# Patient Record
Sex: FEMALE | Race: OTHER | NOT HISPANIC OR LATINO | URBAN - METROPOLITAN AREA
[De-identification: names, ages, dates, MRNs, and addresses within clinical notes are randomized per-mention and may not be internally consistent; named-entity substitution may affect disease eponyms.]

---

## 2017-03-26 ENCOUNTER — EMERGENCY (EMERGENCY)
Facility: HOSPITAL | Age: 47
LOS: 1 days | Discharge: HOME | End: 2017-03-26
Attending: EMERGENCY MEDICINE | Admitting: EMERGENCY MEDICINE
Payer: MEDICAID

## 2017-03-26 VITALS
OXYGEN SATURATION: 99 % | TEMPERATURE: 98 F | RESPIRATION RATE: 16 BRPM | HEART RATE: 65 BPM | DIASTOLIC BLOOD PRESSURE: 85 MMHG | SYSTOLIC BLOOD PRESSURE: 134 MMHG

## 2017-03-26 DIAGNOSIS — Z88.2 ALLERGY STATUS TO SULFONAMIDES: ICD-10-CM

## 2017-03-26 DIAGNOSIS — R51 HEADACHE: ICD-10-CM

## 2017-03-26 DIAGNOSIS — G43.909 MIGRAINE, UNSPECIFIED, NOT INTRACTABLE, WITHOUT STATUS MIGRAINOSUS: ICD-10-CM

## 2017-03-26 PROCEDURE — 99284 EMERGENCY DEPT VISIT MOD MDM: CPT

## 2017-03-26 NOTE — ED ADULT NURSE NOTE - OBJECTIVE STATEMENT
47 y/o female presented to the ED c/o progressively worsening migraine x 3 days. Pt reports intermittent blurred vision, nausea but denies vomiting. Pt reports neck pain and photophobia. Pt denies fevers. Pt is A&Ox3, PERRL, lungs CTA B/L, abd. soft, NT, ND, skin W/D/I, MAex4.

## 2017-03-27 VITALS
SYSTOLIC BLOOD PRESSURE: 103 MMHG | HEART RATE: 68 BPM | OXYGEN SATURATION: 99 % | DIASTOLIC BLOOD PRESSURE: 63 MMHG | RESPIRATION RATE: 16 BRPM

## 2017-03-27 PROCEDURE — 96374 THER/PROPH/DIAG INJ IV PUSH: CPT

## 2017-03-27 PROCEDURE — 99284 EMERGENCY DEPT VISIT MOD MDM: CPT | Mod: 25

## 2017-03-27 PROCEDURE — 96375 TX/PRO/DX INJ NEW DRUG ADDON: CPT

## 2017-03-27 RX ORDER — SODIUM CHLORIDE 9 MG/ML
3 INJECTION INTRAMUSCULAR; INTRAVENOUS; SUBCUTANEOUS EVERY 8 HOURS
Qty: 0 | Refills: 0 | Status: DISCONTINUED | OUTPATIENT
Start: 2017-03-27 | End: 2017-03-30

## 2017-03-27 RX ORDER — ONDANSETRON 8 MG/1
1 TABLET, FILM COATED ORAL
Qty: 6 | Refills: 0 | OUTPATIENT
Start: 2017-03-27 | End: 2017-03-29

## 2017-03-27 RX ORDER — KETOROLAC TROMETHAMINE 30 MG/ML
30 SYRINGE (ML) INJECTION ONCE
Qty: 0 | Refills: 0 | Status: DISCONTINUED | OUTPATIENT
Start: 2017-03-27 | End: 2017-03-27

## 2017-03-27 RX ORDER — ACETAMINOPHEN 500 MG
1000 TABLET ORAL ONCE
Qty: 0 | Refills: 0 | Status: COMPLETED | OUTPATIENT
Start: 2017-03-27 | End: 2017-03-27

## 2017-03-27 RX ORDER — DEXAMETHASONE 0.5 MG/5ML
4 ELIXIR ORAL ONCE
Qty: 0 | Refills: 0 | Status: COMPLETED | OUTPATIENT
Start: 2017-03-27 | End: 2017-03-27

## 2017-03-27 RX ORDER — METOCLOPRAMIDE HCL 10 MG
10 TABLET ORAL ONCE
Qty: 0 | Refills: 0 | Status: COMPLETED | OUTPATIENT
Start: 2017-03-27 | End: 2017-03-27

## 2017-03-27 RX ORDER — SODIUM CHLORIDE 9 MG/ML
1000 INJECTION INTRAMUSCULAR; INTRAVENOUS; SUBCUTANEOUS ONCE
Qty: 0 | Refills: 0 | Status: COMPLETED | OUTPATIENT
Start: 2017-03-27 | End: 2017-03-27

## 2017-03-27 RX ADMIN — SODIUM CHLORIDE 2000 MILLILITER(S): 9 INJECTION INTRAMUSCULAR; INTRAVENOUS; SUBCUTANEOUS at 03:05

## 2017-03-27 RX ADMIN — Medication 30 MILLIGRAM(S): at 00:30

## 2017-03-27 RX ADMIN — Medication 400 MILLIGRAM(S): at 00:29

## 2017-03-27 RX ADMIN — Medication 10 MILLIGRAM(S): at 00:29

## 2017-03-27 RX ADMIN — Medication 1000 MILLIGRAM(S): at 01:16

## 2017-03-27 RX ADMIN — Medication 30 MILLIGRAM(S): at 01:16

## 2017-03-27 RX ADMIN — SODIUM CHLORIDE 3 MILLILITER(S): 9 INJECTION INTRAMUSCULAR; INTRAVENOUS; SUBCUTANEOUS at 01:16

## 2017-03-27 RX ADMIN — Medication 4 MILLIGRAM(S): at 00:29

## 2017-03-27 NOTE — ED PROVIDER NOTE - MEDICAL DECISION MAKING DETAILS
**ATTENDING MEDICAL DECISION MAKING/SYNTHESIS (Dr. Yury Nichole): I have reviewed the Chief Complaint, the HPI, the ROS, and have directly performed and confirmed the findings on the Physical Examination. I have reviewed the medical decision making with all providers, as applicable. The PROBLEM REPRESENTATION at this time is: 46-year-old woman with prior history of headaches (usually takes ibuprofen and baclofen) now with recurrent right-sided frontal and parietal-temporal headache with mild nausea and photophobia but without fevers or chills. NO history of trauma. NO reported sick contacts. DENIES recent travel. POSITIVE allergy to sulfonamides.  The MOST LIKELY DIAGNOSIS, and the LIST OF DIFFERENTIAL DIAGNOSES, includes (but is not limited to) the following: headache, intracerebral hemorrhage, serious bacterial infection or sepsis/severe sepsis e.g. meningococcemia, meningitis (viral or bacterial), or encephalitis, dehydration, electrolyte-metabolic-endocrine derangements, mass/tumor. The likelihood of each of these diagnoses has been appropriately considered in the context of this patient's presentation and my evaluation. PLAN: as described in EMR, including diagnostics, therapeutics and consultation as clinically warranted. I will continue to reevaluate the patient, including the results of all testing, and monitor response to therapy throughout the patient's course in the ED.

## 2017-03-27 NOTE — ED PROVIDER NOTE - MUSCULOSKELETAL, MLM
Spine appears normal, range of motion is not limited, no muscle or joint tenderness **ATTENDING ADDENDUM (Dr. Yury Nichole): strength 5/5 symmetrically equal in the bilateral upper and lower extremities.

## 2017-03-27 NOTE — ED PROVIDER NOTE - OBJECTIVE STATEMENT
**ATTENDING OBJECTIVE STATEMENT (Dr. Yury Nichole): I have reviewed this note, the presenting symptoms, and the Chief Complaint and the History of Present Illness as documented, with the other care provider(s) and nurses on the patient care team. I have also reviewed this patient's past medical/surgical history and social/family history as reviewed and listed in this electronic medical record. Patient is a 46-year-old woman with the chief complaint of migraine headache. Patient reports that she has experienced headaches like this one previously. This headache is reported to be consistent with the severity of other headaches. Right-sided and frontal-parietal-temporal in location. NO movement-associated, pleuritic, reproducible, positional, or exertional component to the symptoms. NO fevers or chills. NO trauma. DENIES recent travel. NO obvious sick contacts. NO vomiting but POSITIVE nausea. NO diarrhea or constipation. NO chest pain, palpitations, shortness of breath, dyspnea on exertion, abdominal pain, or frequency, urgency, hesitancy, dysuria, or flank/back pain. NO syncope or near-syncope. NO numbness, tingling, weakness, or paresthesias. NO focal or generalized weakness. NO speech or visual changes. POSITIVE mild photophobia. Here for evaluation. VAS 3-4/10. Evaluated initially in darkened room.   NOTE: family provided translation services and patient/family did not request, need, or want additional translation services at time of initial evaluation

## 2017-03-27 NOTE — ED PROVIDER NOTE - CARE PLAN
Principal Discharge DX:	Migraine  Goal:	ATTENDING ADDENDUM (Dr. Yury Nichole): Goals of care include resolution of emergent/urgent symptoms and concerns, and restoration to baseline level of homeostasis.  Instructions for follow-up, activity and diet:	ATTENDING ADDENDUM (Dr. Yury Nichole): (1) anticipatory guidance provided  (2) rest  (3) outpatient follow-up with your primary care physician/provider (4) return if symptoms worsen, persist, or do not resolve (5) medications, if indicated, as prescribed (ondanstetron as directed).

## 2017-03-27 NOTE — ED PROVIDER NOTE - EYES, MLM
Clear bilaterally, pupils equal, round and reactive to light. **ATTENDING ADDENDUM (Dr. Yury Nichole): Extraocular muscle movements intact. NO nystagmus. POSITIVE minimal photophobia.

## 2017-03-27 NOTE — ED PROVIDER NOTE - CHPI ED SYMPTOMS NEG
no blurred vision/no vomiting/no numbness/no weakness/no fever/no confusion/no loss of consciousness/no change in level of consciousness/no dizziness

## 2017-03-27 NOTE — ED PROVIDER NOTE - PLAN OF CARE
ATTENDING ADDENDUM (Dr. Yury Nichole): Goals of care include resolution of emergent/urgent symptoms and concerns, and restoration to baseline level of homeostasis. ATTENDING ADDENDUM (Dr. Yury Nichole): (1) anticipatory guidance provided  (2) rest  (3) outpatient follow-up with your primary care physician/provider (4) return if symptoms worsen, persist, or do not resolve (5) medications, if indicated, as prescribed (ondanstetron as directed).

## 2017-03-27 NOTE — ED PROVIDER NOTE - ATTENDING CONTRIBUTION TO CARE
**ATTENDING ADDENDUM (Dr. Yury Nichole): I attest that I have directly examined this patient and reviewed and formulated the diagnostic and therapeutic management plan in collaboration with the EM resident. Please see MDM note and remainder of EMR for findings from CC, HPI, ROS, and PE. (Luisito)

## 2017-03-27 NOTE — ED PROVIDER NOTE - ENMT, MLM
Airway patent, Nasal mucosa clear. Mouth with normal mucosa. Throat has no vesicles, no oropharyngeal exudates and uvula is midline. **ATTENDING ADDENDUM (Dr. Yury Nichole): NO facial droop. NO slurred speech. NO drooling.

## 2017-03-27 NOTE — ED PROVIDER NOTE - PHYSICAL EXAMINATION
**ATTENDING ADDENDUM (Dr. Yury Nichole): I have reviewed and substantially contributed to the elements of the PE as documented above. I have directly performed an examination of this patient in conjunction with the other members (EM resident/PA/NP) of the patient care team.

## 2017-03-27 NOTE — ED PROVIDER NOTE - GASTROINTESTINAL, MLM
Abdomen soft, non-tender. **ATTENDING ADDENDUM (Dr. Yury Nichole): NO guarding, rebound, or rigidity. NO pulsatile or non-pulsatile masses. NO hernias. NO obvious hepatosplenomegaly.

## 2017-03-27 NOTE — ED PROVIDER NOTE - CONDUCTED A DETAILED DISCUSSION WITH PATIENT AND/OR GUARDIAN REGARDING, MDM
return to ED if symptoms worsen, persist or questions arise/**ATTENDING ADDENDUM (Dr. Yury Nichole): Anticipatory guidance provided./need for outpatient follow-up

## 2017-03-27 NOTE — ED PROVIDER NOTE - RESPIRATORY, MLM
Breath sounds clear and equal bilaterally. **ATTENDING ADDENDUM (Dr. Yury Nichole): NO wheezing, rales, rhonchi, crackles, stridor, drooling, retractions, nasal flaring, or tripoding.

## 2017-03-27 NOTE — ED PROVIDER NOTE - CRANIAL NERVE AND PUPILLARY EXAM
cranial nerves 2-12 intact/extra-ocular movements intact/central and peripheral vision intact/gag reflex intact/tongue is midline

## 2017-03-27 NOTE — ED PROVIDER NOTE - PROGRESS NOTE DETAILS
**ATTENDING ADDENDUM (Dr. Yury Nichole): TID 2:11 at time of arrival for shift. **ATTENDING ADDENDUM (Dr. Yury Nichole): TID 2:11 at time of arrival for shift. VAS 4-5/10 at this time. Patient evaluated. Anticipatory guidance provided. Medications ordered. Will continue to observe and monitor closely. **ATTENDING ADDENDUM (Dr. Yury Nichole): patient serially evaluated throughout ED course. NO acute deterioration up to this time in the ED. With improvement following administration of medications as noted in the EMR. Anticipatory guidance provided. NO evidence of acute intracerebral hemorrhage, serious bacterial infection or sepsis/severe sepsis, or mass/tumor at this time. Will continue to observe and monitor closely. **ATTENDING ADDENDUM (Dr. Yury Nichole): marked improvement as noted above. Agree with discharge home with close outpatient followup with primary care physician/provider and with medications (if appropriate, if clinically indicated, and as prescribed, as noted in EMR). Anticipatory guidance provided.

## 2017-11-08 RX ORDER — BACLOFEN 100 %
0 POWDER (GRAM) MISCELLANEOUS
Qty: 0 | Refills: 0 | COMMUNITY

## 2021-02-17 NOTE — ED ADULT NURSE NOTE - NS ED NURSE LEVEL OF CONSCIOUSNESS ORIENTATION
Oriented - self; Oriented - place; Oriented - time [FreeTextEntry1] : WBC 5110 Hgb 9.2 Hct 25.9 .5 Platelets 233,000 Diff normal

## 2021-11-05 NOTE — ED PROVIDER NOTE - NEUROLOGICAL SPEECH
Is This A New Presentation, Or A Follow-Up?: Skin Lesions
How Severe Is Your Skin Lesion?: mild
Have Your Skin Lesions Been Treated?: not been treated
clear

## 2022-06-18 NOTE — ED PROVIDER NOTE - NEUROLOGICAL RAPID ALTERNATE MOVEMENT R
Wt Readings from Last 3 Encounters:   06/15/22 99 8 kg (220 lb)   06/09/22 99 8 kg (220 lb)   05/17/22 98 9 kg (218 lb)     Continue home dose Lasix normal